# Patient Record
Sex: FEMALE | Race: WHITE | Employment: FULL TIME | URBAN - METROPOLITAN AREA
[De-identification: names, ages, dates, MRNs, and addresses within clinical notes are randomized per-mention and may not be internally consistent; named-entity substitution may affect disease eponyms.]

---

## 2020-01-06 LAB — GROUP B STREP PCR: NORMAL

## 2020-01-29 ENCOUNTER — HOSPITAL ENCOUNTER (OUTPATIENT)
Dept: LAB | Facility: CLINIC | Age: 35
Discharge: HOME | End: 2020-01-29
Admitting: OBSTETRICS & GYNECOLOGY
Payer: COMMERCIAL

## 2020-01-29 DIAGNOSIS — Z01.818 PRE-OP EXAM: ICD-10-CM

## 2020-01-29 LAB
ABO + RH BLD: ABNORMAL
ABO + RH BLD: ABNORMAL
BLD GP AB INVEST PLASRBC-IMP: ABNORMAL
BLD GP AB SCN SERPL QL: ABNORMAL
BLOOD BANK CMNT PATIENT-IMP: ABNORMAL
HGB BLD-MCNC: 12.5 G/DL (ref 11.7–15.7)
SPECIMEN EXP DATE BLD: ABNORMAL
T PALLIDUM AB SER QL: NONREACTIVE

## 2020-01-29 PROCEDURE — 36415 COLL VENOUS BLD VENIPUNCTURE: CPT | Performed by: OBSTETRICS & GYNECOLOGY

## 2020-01-29 PROCEDURE — 86780 TREPONEMA PALLIDUM: CPT | Performed by: OBSTETRICS & GYNECOLOGY

## 2020-01-29 PROCEDURE — 86870 RBC ANTIBODY IDENTIFICATION: CPT | Performed by: OBSTETRICS & GYNECOLOGY

## 2020-01-29 PROCEDURE — 86901 BLOOD TYPING SEROLOGIC RH(D): CPT | Performed by: OBSTETRICS & GYNECOLOGY

## 2020-01-29 PROCEDURE — 86900 BLOOD TYPING SEROLOGIC ABO: CPT | Performed by: OBSTETRICS & GYNECOLOGY

## 2020-01-29 PROCEDURE — 85018 HEMOGLOBIN: CPT | Performed by: OBSTETRICS & GYNECOLOGY

## 2020-01-29 PROCEDURE — 86850 RBC ANTIBODY SCREEN: CPT | Performed by: OBSTETRICS & GYNECOLOGY

## 2020-01-30 ENCOUNTER — ANESTHESIA EVENT (OUTPATIENT)
Dept: OBGYN | Facility: CLINIC | Age: 35
End: 2020-01-30
Payer: COMMERCIAL

## 2020-01-30 ENCOUNTER — ANESTHESIA (OUTPATIENT)
Dept: OBGYN | Facility: CLINIC | Age: 35
End: 2020-01-30
Payer: COMMERCIAL

## 2020-01-30 ENCOUNTER — HOSPITAL ENCOUNTER (INPATIENT)
Facility: CLINIC | Age: 35
LOS: 3 days | Discharge: HOME HEALTH | End: 2020-02-02
Attending: OBSTETRICS & GYNECOLOGY | Admitting: OBSTETRICS & GYNECOLOGY
Payer: COMMERCIAL

## 2020-01-30 DIAGNOSIS — Z98.891 S/P CESAREAN SECTION: Primary | ICD-10-CM

## 2020-01-30 PROCEDURE — 37000009 ZZH ANESTHESIA TECHNICAL FEE, EACH ADDTL 15 MIN: Performed by: OBSTETRICS & GYNECOLOGY

## 2020-01-30 PROCEDURE — 25800030 ZZH RX IP 258 OP 636: Performed by: NURSE ANESTHETIST, CERTIFIED REGISTERED

## 2020-01-30 PROCEDURE — 37000008 ZZH ANESTHESIA TECHNICAL FEE, 1ST 30 MIN: Performed by: OBSTETRICS & GYNECOLOGY

## 2020-01-30 PROCEDURE — 25000125 ZZHC RX 250: Performed by: OBSTETRICS & GYNECOLOGY

## 2020-01-30 PROCEDURE — 25000132 ZZH RX MED GY IP 250 OP 250 PS 637: Performed by: OBSTETRICS & GYNECOLOGY

## 2020-01-30 PROCEDURE — 25800030 ZZH RX IP 258 OP 636: Performed by: OBSTETRICS & GYNECOLOGY

## 2020-01-30 PROCEDURE — 36000056 ZZH SURGERY LEVEL 3 1ST 30 MIN: Performed by: OBSTETRICS & GYNECOLOGY

## 2020-01-30 PROCEDURE — 71000012 ZZH RECOVERY PHASE 1 LEVEL 1 FIRST HR: Performed by: OBSTETRICS & GYNECOLOGY

## 2020-01-30 PROCEDURE — 25000128 H RX IP 250 OP 636: Performed by: OBSTETRICS & GYNECOLOGY

## 2020-01-30 PROCEDURE — 25000128 H RX IP 250 OP 636: Performed by: NURSE ANESTHETIST, CERTIFIED REGISTERED

## 2020-01-30 PROCEDURE — 25000125 ZZHC RX 250: Performed by: NURSE ANESTHETIST, CERTIFIED REGISTERED

## 2020-01-30 PROCEDURE — 12000035 ZZH R&B POSTPARTUM

## 2020-01-30 PROCEDURE — 27210794 ZZH OR GENERAL SUPPLY STERILE: Performed by: OBSTETRICS & GYNECOLOGY

## 2020-01-30 PROCEDURE — 36000058 ZZH SURGERY LEVEL 3 EA 15 ADDTL MIN: Performed by: OBSTETRICS & GYNECOLOGY

## 2020-01-30 RX ORDER — ACETAMINOPHEN 325 MG/1
TABLET ORAL
Status: DISCONTINUED
Start: 2020-01-30 | End: 2020-01-30 | Stop reason: HOSPADM

## 2020-01-30 RX ORDER — ONDANSETRON 4 MG/1
4 TABLET, ORALLY DISINTEGRATING ORAL EVERY 30 MIN PRN
Status: DISCONTINUED | OUTPATIENT
Start: 2020-01-30 | End: 2020-01-30 | Stop reason: HOSPADM

## 2020-01-30 RX ORDER — LANOLIN 100 %
OINTMENT (GRAM) TOPICAL
Status: DISCONTINUED | OUTPATIENT
Start: 2020-01-30 | End: 2020-02-02 | Stop reason: HOSPADM

## 2020-01-30 RX ORDER — ONDANSETRON 2 MG/ML
INJECTION INTRAMUSCULAR; INTRAVENOUS PRN
Status: DISCONTINUED | OUTPATIENT
Start: 2020-01-30 | End: 2020-01-30

## 2020-01-30 RX ORDER — ONDANSETRON 2 MG/ML
INJECTION INTRAMUSCULAR; INTRAVENOUS
Status: DISCONTINUED
Start: 2020-01-30 | End: 2020-01-30 | Stop reason: HOSPADM

## 2020-01-30 RX ORDER — ACETAMINOPHEN 325 MG/1
975 TABLET ORAL EVERY 8 HOURS
Status: COMPLETED | OUTPATIENT
Start: 2020-01-30 | End: 2020-02-02

## 2020-01-30 RX ORDER — OXYTOCIN 10 [USP'U]/ML
10 INJECTION, SOLUTION INTRAMUSCULAR; INTRAVENOUS
Status: DISCONTINUED | OUTPATIENT
Start: 2020-01-30 | End: 2020-02-02 | Stop reason: HOSPADM

## 2020-01-30 RX ORDER — NALOXONE HYDROCHLORIDE 0.4 MG/ML
.1-.4 INJECTION, SOLUTION INTRAMUSCULAR; INTRAVENOUS; SUBCUTANEOUS
Status: DISCONTINUED | OUTPATIENT
Start: 2020-01-30 | End: 2020-02-02 | Stop reason: HOSPADM

## 2020-01-30 RX ORDER — NALOXONE HYDROCHLORIDE 0.4 MG/ML
.1-.4 INJECTION, SOLUTION INTRAMUSCULAR; INTRAVENOUS; SUBCUTANEOUS
Status: DISCONTINUED | OUTPATIENT
Start: 2020-01-30 | End: 2020-01-30

## 2020-01-30 RX ORDER — CEFAZOLIN SODIUM 1 G/3ML
1 INJECTION, POWDER, FOR SOLUTION INTRAMUSCULAR; INTRAVENOUS SEE ADMIN INSTRUCTIONS
Status: DISCONTINUED | OUTPATIENT
Start: 2020-01-30 | End: 2020-01-30

## 2020-01-30 RX ORDER — MORPHINE SULFATE 1 MG/ML
INJECTION, SOLUTION EPIDURAL; INTRATHECAL; INTRAVENOUS
Status: DISCONTINUED
Start: 2020-01-30 | End: 2020-01-30 | Stop reason: HOSPADM

## 2020-01-30 RX ORDER — EPHEDRINE SULFATE 50 MG/ML
INJECTION, SOLUTION INTRAMUSCULAR; INTRAVENOUS; SUBCUTANEOUS PRN
Status: DISCONTINUED | OUTPATIENT
Start: 2020-01-30 | End: 2020-01-30

## 2020-01-30 RX ORDER — FENTANYL CITRATE 50 UG/ML
INJECTION, SOLUTION INTRAMUSCULAR; INTRAVENOUS PRN
Status: DISCONTINUED | OUTPATIENT
Start: 2020-01-30 | End: 2020-01-30

## 2020-01-30 RX ORDER — HYDROMORPHONE HYDROCHLORIDE 1 MG/ML
.3-.5 INJECTION, SOLUTION INTRAMUSCULAR; INTRAVENOUS; SUBCUTANEOUS EVERY 30 MIN PRN
Status: DISCONTINUED | OUTPATIENT
Start: 2020-01-30 | End: 2020-02-02 | Stop reason: HOSPADM

## 2020-01-30 RX ORDER — CITRIC ACID/SODIUM CITRATE 334-500MG
30 SOLUTION, ORAL ORAL
Status: COMPLETED | OUTPATIENT
Start: 2020-01-30 | End: 2020-01-30

## 2020-01-30 RX ORDER — SODIUM CHLORIDE, SODIUM LACTATE, POTASSIUM CHLORIDE, CALCIUM CHLORIDE 600; 310; 30; 20 MG/100ML; MG/100ML; MG/100ML; MG/100ML
INJECTION, SOLUTION INTRAVENOUS CONTINUOUS
Status: DISCONTINUED | OUTPATIENT
Start: 2020-01-30 | End: 2020-01-30

## 2020-01-30 RX ORDER — FENTANYL CITRATE 50 UG/ML
25-50 INJECTION, SOLUTION INTRAMUSCULAR; INTRAVENOUS
Status: DISCONTINUED | OUTPATIENT
Start: 2020-01-30 | End: 2020-01-30 | Stop reason: HOSPADM

## 2020-01-30 RX ORDER — BUPIVACAINE HYDROCHLORIDE 7.5 MG/ML
INJECTION, SOLUTION INTRASPINAL PRN
Status: DISCONTINUED | OUTPATIENT
Start: 2020-01-30 | End: 2020-01-30

## 2020-01-30 RX ORDER — NALOXONE HYDROCHLORIDE 0.4 MG/ML
.1-.4 INJECTION, SOLUTION INTRAMUSCULAR; INTRAVENOUS; SUBCUTANEOUS
Status: ACTIVE | OUTPATIENT
Start: 2020-01-30 | End: 2020-01-31

## 2020-01-30 RX ORDER — DEXTROSE, SODIUM CHLORIDE, SODIUM LACTATE, POTASSIUM CHLORIDE, AND CALCIUM CHLORIDE 5; .6; .31; .03; .02 G/100ML; G/100ML; G/100ML; G/100ML; G/100ML
INJECTION, SOLUTION INTRAVENOUS CONTINUOUS
Status: DISCONTINUED | OUTPATIENT
Start: 2020-01-30 | End: 2020-02-02 | Stop reason: HOSPADM

## 2020-01-30 RX ORDER — SIMETHICONE 80 MG
80 TABLET,CHEWABLE ORAL 4 TIMES DAILY PRN
Status: DISCONTINUED | OUTPATIENT
Start: 2020-01-30 | End: 2020-02-02 | Stop reason: HOSPADM

## 2020-01-30 RX ORDER — OXYTOCIN/0.9 % SODIUM CHLORIDE 30/500 ML
PLASTIC BAG, INJECTION (ML) INTRAVENOUS
Status: DISCONTINUED
Start: 2020-01-30 | End: 2020-01-30 | Stop reason: HOSPADM

## 2020-01-30 RX ORDER — KETOROLAC TROMETHAMINE 30 MG/ML
30 INJECTION, SOLUTION INTRAMUSCULAR; INTRAVENOUS EVERY 6 HOURS
Status: COMPLETED | OUTPATIENT
Start: 2020-01-30 | End: 2020-01-31

## 2020-01-30 RX ORDER — OXYTOCIN/0.9 % SODIUM CHLORIDE 30/500 ML
100 PLASTIC BAG, INJECTION (ML) INTRAVENOUS CONTINUOUS
Status: DISCONTINUED | OUTPATIENT
Start: 2020-01-30 | End: 2020-02-02 | Stop reason: HOSPADM

## 2020-01-30 RX ORDER — HYDROCORTISONE 2.5 %
CREAM (GRAM) TOPICAL 3 TIMES DAILY PRN
Status: DISCONTINUED | OUTPATIENT
Start: 2020-01-30 | End: 2020-02-01

## 2020-01-30 RX ORDER — AMOXICILLIN 250 MG
1 CAPSULE ORAL 2 TIMES DAILY PRN
Status: DISCONTINUED | OUTPATIENT
Start: 2020-01-30 | End: 2020-02-02 | Stop reason: HOSPADM

## 2020-01-30 RX ORDER — AMOXICILLIN 250 MG
2 CAPSULE ORAL 2 TIMES DAILY PRN
Status: DISCONTINUED | OUTPATIENT
Start: 2020-01-30 | End: 2020-02-02 | Stop reason: HOSPADM

## 2020-01-30 RX ORDER — LIDOCAINE 40 MG/G
CREAM TOPICAL
Status: DISCONTINUED | OUTPATIENT
Start: 2020-01-30 | End: 2020-02-02 | Stop reason: HOSPADM

## 2020-01-30 RX ORDER — BISACODYL 10 MG
10 SUPPOSITORY, RECTAL RECTAL DAILY PRN
Status: DISCONTINUED | OUTPATIENT
Start: 2020-02-01 | End: 2020-02-02 | Stop reason: HOSPADM

## 2020-01-30 RX ORDER — OXYTOCIN/0.9 % SODIUM CHLORIDE 30/500 ML
340 PLASTIC BAG, INJECTION (ML) INTRAVENOUS CONTINUOUS PRN
Status: DISCONTINUED | OUTPATIENT
Start: 2020-01-30 | End: 2020-02-02 | Stop reason: HOSPADM

## 2020-01-30 RX ORDER — ACETAMINOPHEN 325 MG/1
650 TABLET ORAL EVERY 4 HOURS PRN
Status: DISCONTINUED | OUTPATIENT
Start: 2020-02-02 | End: 2020-02-02 | Stop reason: HOSPADM

## 2020-01-30 RX ORDER — KETOROLAC TROMETHAMINE 30 MG/ML
INJECTION, SOLUTION INTRAMUSCULAR; INTRAVENOUS
Status: COMPLETED
Start: 2020-01-30 | End: 2020-01-30

## 2020-01-30 RX ORDER — OXYTOCIN/0.9 % SODIUM CHLORIDE 30/500 ML
PLASTIC BAG, INJECTION (ML) INTRAVENOUS CONTINUOUS PRN
Status: DISCONTINUED | OUTPATIENT
Start: 2020-01-30 | End: 2020-01-30

## 2020-01-30 RX ORDER — IBUPROFEN 400 MG/1
800 TABLET, FILM COATED ORAL EVERY 6 HOURS PRN
Status: DISCONTINUED | OUTPATIENT
Start: 2020-01-30 | End: 2020-01-30

## 2020-01-30 RX ORDER — SODIUM CHLORIDE, SODIUM LACTATE, POTASSIUM CHLORIDE, CALCIUM CHLORIDE 600; 310; 30; 20 MG/100ML; MG/100ML; MG/100ML; MG/100ML
INJECTION, SOLUTION INTRAVENOUS CONTINUOUS
Status: DISCONTINUED | OUTPATIENT
Start: 2020-01-30 | End: 2020-01-30 | Stop reason: HOSPADM

## 2020-01-30 RX ORDER — NALBUPHINE HYDROCHLORIDE 10 MG/ML
2.5-5 INJECTION, SOLUTION INTRAMUSCULAR; INTRAVENOUS; SUBCUTANEOUS EVERY 6 HOURS PRN
Status: DISCONTINUED | OUTPATIENT
Start: 2020-01-30 | End: 2020-01-30

## 2020-01-30 RX ORDER — CEFAZOLIN SODIUM 2 G/100ML
2 INJECTION, SOLUTION INTRAVENOUS
Status: COMPLETED | OUTPATIENT
Start: 2020-01-30 | End: 2020-01-30

## 2020-01-30 RX ORDER — IBUPROFEN 400 MG/1
800 TABLET, FILM COATED ORAL EVERY 6 HOURS PRN
Status: DISCONTINUED | OUTPATIENT
Start: 2020-01-31 | End: 2020-02-02 | Stop reason: HOSPADM

## 2020-01-30 RX ORDER — EPHEDRINE SULFATE 50 MG/ML
5 INJECTION, SOLUTION INTRAMUSCULAR; INTRAVENOUS; SUBCUTANEOUS
Status: DISCONTINUED | OUTPATIENT
Start: 2020-01-30 | End: 2020-01-30

## 2020-01-30 RX ORDER — LIDOCAINE 40 MG/G
CREAM TOPICAL
Status: DISCONTINUED | OUTPATIENT
Start: 2020-01-30 | End: 2020-01-30

## 2020-01-30 RX ORDER — ONDANSETRON 2 MG/ML
4 INJECTION INTRAMUSCULAR; INTRAVENOUS EVERY 6 HOURS PRN
Status: DISCONTINUED | OUTPATIENT
Start: 2020-01-30 | End: 2020-02-02 | Stop reason: HOSPADM

## 2020-01-30 RX ORDER — CALCIUM CARBONATE 500 MG/1
2 TABLET, CHEWABLE ORAL 3 TIMES DAILY
Status: ON HOLD | COMMUNITY
End: 2020-02-02

## 2020-01-30 RX ORDER — ONDANSETRON 2 MG/ML
4 INJECTION INTRAMUSCULAR; INTRAVENOUS EVERY 30 MIN PRN
Status: DISCONTINUED | OUTPATIENT
Start: 2020-01-30 | End: 2020-01-30 | Stop reason: HOSPADM

## 2020-01-30 RX ORDER — MORPHINE SULFATE 1 MG/ML
INJECTION, SOLUTION EPIDURAL; INTRATHECAL; INTRAVENOUS PRN
Status: DISCONTINUED | OUTPATIENT
Start: 2020-01-30 | End: 2020-01-30

## 2020-01-30 RX ORDER — OXYCODONE HYDROCHLORIDE 5 MG/1
5-10 TABLET ORAL
Status: DISCONTINUED | OUTPATIENT
Start: 2020-01-30 | End: 2020-02-02 | Stop reason: HOSPADM

## 2020-01-30 RX ORDER — KETOROLAC TROMETHAMINE 30 MG/ML
INJECTION, SOLUTION INTRAMUSCULAR; INTRAVENOUS PRN
Status: DISCONTINUED | OUTPATIENT
Start: 2020-01-30 | End: 2020-01-30

## 2020-01-30 RX ORDER — FENTANYL CITRATE 50 UG/ML
INJECTION, SOLUTION INTRAMUSCULAR; INTRAVENOUS
Status: DISCONTINUED
Start: 2020-01-30 | End: 2020-01-30 | Stop reason: HOSPADM

## 2020-01-30 RX ORDER — HYDROMORPHONE HYDROCHLORIDE 1 MG/ML
.3-.5 INJECTION, SOLUTION INTRAMUSCULAR; INTRAVENOUS; SUBCUTANEOUS EVERY 5 MIN PRN
Status: DISCONTINUED | OUTPATIENT
Start: 2020-01-30 | End: 2020-01-30 | Stop reason: HOSPADM

## 2020-01-30 RX ADMIN — PHENYLEPHRINE HYDROCHLORIDE 0.5 MCG/KG/MIN: 10 INJECTION INTRAVENOUS at 13:02

## 2020-01-30 RX ADMIN — SODIUM CITRATE AND CITRIC ACID MONOHYDRATE 30 ML: 500; 334 SOLUTION ORAL at 12:48

## 2020-01-30 RX ADMIN — PHENYLEPHRINE HYDROCHLORIDE 100 MCG: 10 INJECTION INTRAVENOUS at 13:45

## 2020-01-30 RX ADMIN — PHENYLEPHRINE HYDROCHLORIDE 100 MCG: 10 INJECTION INTRAVENOUS at 13:51

## 2020-01-30 RX ADMIN — SENNOSIDES AND DOCUSATE SODIUM 1 TABLET: 8.6; 5 TABLET ORAL at 20:45

## 2020-01-30 RX ADMIN — SODIUM CHLORIDE, POTASSIUM CHLORIDE, SODIUM LACTATE AND CALCIUM CHLORIDE 1000 ML: 600; 310; 30; 20 INJECTION, SOLUTION INTRAVENOUS at 10:30

## 2020-01-30 RX ADMIN — Medication 100 ML/HR: at 17:27

## 2020-01-30 RX ADMIN — Medication 5 MG: at 13:12

## 2020-01-30 RX ADMIN — KETOROLAC TROMETHAMINE 30 MG: 30 INJECTION, SOLUTION INTRAMUSCULAR at 14:05

## 2020-01-30 RX ADMIN — MORPHINE SULFATE 0.15 MG: 1 INJECTION, SOLUTION EPIDURAL; INTRATHECAL; INTRAVENOUS at 13:02

## 2020-01-30 RX ADMIN — Medication 340 ML/HR: at 13:27

## 2020-01-30 RX ADMIN — BUPIVACAINE HYDROCHLORIDE IN DEXTROSE 12 MG: 7.5 INJECTION, SOLUTION SUBARACHNOID at 13:02

## 2020-01-30 RX ADMIN — KETOROLAC TROMETHAMINE 30 MG: 30 INJECTION, SOLUTION INTRAMUSCULAR at 20:45

## 2020-01-30 RX ADMIN — ACETAMINOPHEN 975 MG: 325 TABLET, FILM COATED ORAL at 23:35

## 2020-01-30 RX ADMIN — ONDANSETRON 4 MG: 2 INJECTION INTRAMUSCULAR; INTRAVENOUS at 12:56

## 2020-01-30 RX ADMIN — CEFAZOLIN SODIUM 2 G: 2 INJECTION, SOLUTION INTRAVENOUS at 13:04

## 2020-01-30 RX ADMIN — SODIUM CHLORIDE, POTASSIUM CHLORIDE, SODIUM LACTATE AND CALCIUM CHLORIDE: 600; 310; 30; 20 INJECTION, SOLUTION INTRAVENOUS at 10:20

## 2020-01-30 RX ADMIN — Medication 5 MG: at 13:40

## 2020-01-30 RX ADMIN — FENTANYL CITRATE 15 MCG: 50 INJECTION, SOLUTION INTRAMUSCULAR; INTRAVENOUS at 13:02

## 2020-01-30 RX ADMIN — SODIUM CHLORIDE, SODIUM LACTATE, POTASSIUM CHLORIDE, CALCIUM CHLORIDE AND DEXTROSE MONOHYDRATE 1000 ML: 5; 600; 310; 30; 20 INJECTION, SOLUTION INTRAVENOUS at 22:14

## 2020-01-30 RX ADMIN — ACETAMINOPHEN 975 MG: 325 TABLET, FILM COATED ORAL at 14:32

## 2020-01-30 ASSESSMENT — MIFFLIN-ST. JEOR: SCORE: 1503.07

## 2020-01-30 ASSESSMENT — COPD QUESTIONNAIRES: COPD: 0

## 2020-01-30 ASSESSMENT — LIFESTYLE VARIABLES: TOBACCO_USE: 0

## 2020-01-30 NOTE — OP NOTE
Section Operative Note     PREOPERATIVE DIAGNOSES:   1. Intrauterine pregnancy at 39w4d    2. Previous  section x2  POSTOPERATIVE DIAGNOSES:   1. Same  2. S/p repeat  section  PROCEDURE: Repeat low transverse  section  ESTIMATED BLOOD LOSS: 270 ml  UOP:  400ml  SURGEON: Padmini Carreno MD  ASSISTANT: Brina Resendez NP  ANESTHESIA: Spinal with Duramorph  ANTIBIOTICS: 2 grams IV cefazolin  FINDINGS: A viable vigorous female infant delivered at 1:35pm hours on 2020 delivered from the vertex presentation. Clear amniotic fluid. Weight was 6 pounds 12 ounces. Apgars were 8 and 9 at 1 and 5 minutes respectively. Normal appearing uterus, tubes and ovaries. Placenta appeared grossly normal.  COMPLICATIONS: None apparent.   INDICATIONS: Patient is a 34 year old ,   At 39w4d  weeks gestational age who presented to Labor and Delivery for a scheduled repeat  section.  Risks, benefits and alternatives to proceeding with a  section were discussed with the patient including risk of infection, bleeding, injury to surrounding structures and the patient wished to proceed. Consent form was reviewed and signed prior to proceeding.  PROCEDURE: The patient was taken to the operating room with IV fluids running. She was then given a spinal anesthetic without complications. She received Ancef 2 grams IV prior to starting the procedure. Pneumoboots were on and activated. The patient was placed in the dorsal supine position with a leftward tilt and a Soto catheter was placed. She was then prepped and draped in the usual sterile fashion. A Pfannenstiel skin incision was then made and carried through to the underlying layer of fascia using the scalpel and cautery. The fascia was then incised sharply and extended laterally with Barry scissors. The fascia was then grasped with Kocher clamps and elevated off the underlying rectus muscles with the use of the cautery and Barry scissors.  Scar tissue was noted involving the fascia and peritoneum. The rectus muscles were  along the midline. The peritoneum was identified and entered bluntly. The peritoneal incision was extended laterally with good visualization of the bladder. A large Martín retractor was placed. The vesicouterine peritoneum was identified and incised sharply with the Metzenbaum scissors. The bladder flap was then created digitally.   The lower uterine segment was identified and was quite thin. It was incised sharply with a scalpel in a transverse fashion. The uterine incision was then extended with cephalocaudad stretch. The infant was then delivered from the vertex OT presentation. The cord was clamped and cut after 1 minute. A portion of cord was cut for cord gases. Then bulb suction was used to suction the mouth and nares. The infant was then handed to the waiting nursing team.   At this point the placenta was then manually extracted. Pitocin was given in IV fluids after delivery of the placenta. The uterus was left in situ and cleared of all clots and debris. The uterine incision was then repaired with 0 Vicryl suture in a running locked fashion. A second imbricating layer was then used to horizontally imbricate for hemostasis. The gutters were irrigated and cleared of all clots and debris. The uterine incision was then inspected and noted to be hemostatic.   The rectus muscles, fascia and subcutaneous layers were then inspected and made hemostatic with cautery. The peritoneum was closed with a running suture of 3-0 Vicryl. Fascia was closed with 0 Vicryl. Subcutaneous tissues were copiously irrigated and made hemostatic with cautery.  The skin was then closed with a running suture of 4-0 Vicryl followed by steri strips and a sterile dressing.   Sponge, lap and needle counts were reported as correct. The patient tolerated the procedure well and was taken to the Recovery Room in stable condition.     Padmini Carreno  MD  1/30/2020  2:08 PM

## 2020-01-30 NOTE — ANESTHESIA PROCEDURE NOTES
Peripheral nerve/Neuraxial procedure note : intrathecal  Pre-Procedure  Performed by Coleman Nunes MD  Location: OR      Pre-Anesthestic Checklist: patient identified, IV checked, risks and benefits discussed, informed consent, monitors and equipment checked, pre-op evaluation and at physician/surgeon's request    Timeout  Correct Patient: Yes   Correct Procedure: Yes   Correct Site: Yes   Correct Laterality: Yes   Correct Position: Yes     .   Procedure Documentation    .    Procedure: intrathecal, .   Patient Position:sitting Insertion Site:L4-5  (midline approach)     Patient Prep/Sterile Barriers; mask, sterile gloves, povidone-iodine 7.5% surgical scrub, patient draped.  .  Needle:  Spinal Needle (gauge): 25  Spinal/LP Needle Length (inches): 3.5 Introducer used Introducer: 20 G .        Assessment/Narrative  Paresthesias: No.  .  .  clear CSF fluid removed . Comments:  Injected 12 mg bupivacaine and 0.15 mg duramorph and 15 mcg of fentanyl

## 2020-01-30 NOTE — ANESTHESIA CARE TRANSFER NOTE
Patient: Merline Ortiz    Procedure(s):  REPEAT LOW TRANSVERSE  SECTION    Diagnosis: Previous  section [Z98.891]  Diagnosis Additional Information: No value filed.    Anesthesia Type:   Spinal     Note:  Airway :Room Air  Patient transferred to:Labor and Delivery  Comments: Pt to OB PACU on room air, airway patent, VSS.  Report to RN.Handoff Report: Identifed the Patient, Identified the Reponsible Provider, Reviewed the pertinent medical history, Discussed the surgical course, Reviewed Intra-OP anesthesia mangement and issues during anesthesia, Set expectations for post-procedure period and Allowed opportunity for questions and acknowledgement of understanding      Vitals: (Last set prior to Anesthesia Care Transfer)    CRNA VITALS  2020 1339 - 2020 1414      2020             Resp Rate (set):  10                Electronically Signed By: MACARENA Rascon CRNA  2020  2:14 PM

## 2020-01-30 NOTE — ANESTHESIA PREPROCEDURE EVALUATION
Anesthesia Pre-Procedure Evaluation    Patient: Merline Ortiz   MRN: [id number] : 1985          Preoperative Diagnosis: Previous  section [Z98.891]    Procedure(s):  REPEAT LOW TRANSVERSE  SECTION    Past Medical History:   Diagnosis Date     Chronic kidney disease     hx. 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 Pt has had prior anesthetic. Type: General    History of anesthetic complications    high spinal with first pregancy      ROS/MED HX    ENT/Pulmonary:      (-) tobacco use, asthma and COPD   Neurologic:      (-) CVA, TIA and Neuropathy   Cardiovascular:        (-) hypertension, CAD, irregular heartbeat/palpitations and stent   METS/Exercise Tolerance:     Hematologic:        (-) anemia   Musculoskeletal:         GI/Hepatic:     (+) GERD      (-) liver disease   Renal/Genitourinary:      (-) renal disease   Endo:      (-) Type I DM, Type II DM and thyroid disease   Psychiatric:         Infectious Disease:  - neg infectious disease ROS       Malignancy:         Other:                          Physical Exam  Normal systems: cardiovascular, pulmonary and dental    Airway   Mallampati: II  TM distance: >3 FB  Neck ROM: full    Dental     Cardiovascular   Rhythm and rate: regular and normal      Pulmonary    breath sounds clear to auscultation            Lab Results   Component Value Date    WBC 8.8 2015    HGB 12.5 2020    HCT 34.5 (L) 2015     2015     2015     "POTASSIUM 3.7 08/28/2015    CHLORIDE 108 08/28/2015    CO2 21 08/28/2015    BUN 6 (L) 08/28/2015    CR 0.51 (L) 08/28/2015    GLC 81 08/28/2015    KARI 8.4 (L) 08/28/2015       Preop Vitals  BP Readings from Last 3 Encounters:   08/31/15 90/56    Pulse Readings from Last 3 Encounters:   08/31/15 62      Resp Readings from Last 3 Encounters:   08/31/15 16    SpO2 Readings from Last 3 Encounters:   08/29/15 100%      Temp Readings from Last 1 Encounters:   08/31/15 36.9  C (98.4  F) (Oral)    Ht Readings from Last 1 Encounters:   01/30/20 1.664 m (5' 5.5\")      Wt Readings from Last 1 Encounters:   01/30/20 78.9 kg (174 lb)    Estimated body mass index is 28.51 kg/m  as calculated from the following:    Height as of this encounter: 1.664 m (5' 5.5\").    Weight as of this encounter: 78.9 kg (174 lb).       Anesthesia Plan      History & Physical Review  History and physical reviewed and following examination; no interval change.    ASA Status:  2 .    NPO Status:  > 6 hours    Plan for Spinal   PONV prophylaxis:  Ondansetron (or other 5HT-3)       Postoperative Care      Consents  Anesthetic plan, risks, benefits and alternatives discussed with:  Patient..                 Coleman Nunes MD  "

## 2020-01-30 NOTE — PLAN OF CARE
Pt arrived at approx 0900 for repeat  section with spouse. Orientation to unit routine and plan of care completed. Assumed care of patient in OR as circulating RN. See OR documentation for details. Pt transferred to recovery room for recovery. Fundus firm, U/2, scant flow. Incision c/d/i. Pt denies pain. Pt given toradol by CRNA near end of procedure and tylenol in recovery room. Breast feeding infant, bonding with infant. All questions and concerns addressed. Cont to monitor and assess.

## 2020-01-30 NOTE — PLAN OF CARE
Data: Merline Ortiz transferred to room 403 via wheelchair at 1620. Baby transferred via parent's arms.  Action: Receiving unit notified of transfer: Yes. Patient and family notified of room change. Report given to Kiera BREWER at 1620. Belongings sent to receiving unit. Accompanied by Registered Nurse. Oriented patient to surroundings. Call light within reach. ID bands double-checked with receiving RN.  Response: Patient tolerated transfer and is stable.

## 2020-01-30 NOTE — ANESTHESIA POSTPROCEDURE EVALUATION
Patient: Merline Ortiz    Procedure(s):  REPEAT LOW TRANSVERSE  SECTION    Diagnosis:Previous  section [Z98.891]  Diagnosis Additional Information: No value filed.    Anesthesia Type:  Spinal    Note:  Anesthesia Post Evaluation    Patient location during evaluation: OB PACU  Patient participation: Able to participate in evaluation but full recovery from regional anesthesia has not yet ocurrred but is anticipated to occur within 48 hours (Spinal wearing off)  Level of consciousness: awake and alert  Pain management: adequate  Airway patency: patent  Cardiovascular status: acceptable  Respiratory status: acceptable  Hydration status: acceptable  PONV: none     Anesthetic complications: None          Last vitals:  Vitals:    20 1530 20 1545 20 1600   BP: 104/78 (!) 88/57 95/65   Pulse: 61 62 68   Resp: 15 12 16   Temp:   36.5  C (97.7  F)   SpO2: 97% 94% 99%         Electronically Signed By: Coleman Nunes MD  2020  4:27 PM

## 2020-01-31 LAB
BLOOD BANK CMNT PATIENT-IMP: NORMAL
HGB BLD-MCNC: 10.7 G/DL (ref 11.7–15.7)

## 2020-01-31 PROCEDURE — 12000035 ZZH R&B POSTPARTUM

## 2020-01-31 PROCEDURE — 25000128 H RX IP 250 OP 636: Performed by: OBSTETRICS & GYNECOLOGY

## 2020-01-31 PROCEDURE — 36415 COLL VENOUS BLD VENIPUNCTURE: CPT | Performed by: OBSTETRICS & GYNECOLOGY

## 2020-01-31 PROCEDURE — 85018 HEMOGLOBIN: CPT | Performed by: OBSTETRICS & GYNECOLOGY

## 2020-01-31 PROCEDURE — 85461 HEMOGLOBIN FETAL: CPT | Performed by: OBSTETRICS & GYNECOLOGY

## 2020-01-31 PROCEDURE — 86900 BLOOD TYPING SEROLOGIC ABO: CPT | Performed by: OBSTETRICS & GYNECOLOGY

## 2020-01-31 PROCEDURE — 86901 BLOOD TYPING SEROLOGIC RH(D): CPT | Performed by: OBSTETRICS & GYNECOLOGY

## 2020-01-31 PROCEDURE — 25000132 ZZH RX MED GY IP 250 OP 250 PS 637: Performed by: OBSTETRICS & GYNECOLOGY

## 2020-01-31 RX ADMIN — SENNOSIDES AND DOCUSATE SODIUM 1 TABLET: 8.6; 5 TABLET ORAL at 20:06

## 2020-01-31 RX ADMIN — IBUPROFEN 800 MG: 400 TABLET ORAL at 20:59

## 2020-01-31 RX ADMIN — OXYCODONE HYDROCHLORIDE 5 MG: 5 TABLET ORAL at 15:27

## 2020-01-31 RX ADMIN — OXYCODONE HYDROCHLORIDE 5 MG: 5 TABLET ORAL at 20:06

## 2020-01-31 RX ADMIN — ACETAMINOPHEN 975 MG: 325 TABLET, FILM COATED ORAL at 15:27

## 2020-01-31 RX ADMIN — KETOROLAC TROMETHAMINE 30 MG: 30 INJECTION, SOLUTION INTRAMUSCULAR at 08:40

## 2020-01-31 RX ADMIN — ACETAMINOPHEN 975 MG: 325 TABLET, FILM COATED ORAL at 07:54

## 2020-01-31 RX ADMIN — ACETAMINOPHEN 975 MG: 325 TABLET, FILM COATED ORAL at 23:41

## 2020-01-31 RX ADMIN — KETOROLAC TROMETHAMINE 30 MG: 30 INJECTION, SOLUTION INTRAMUSCULAR at 14:49

## 2020-01-31 RX ADMIN — SIMETHICONE CHEW TAB 80 MG 80 MG: 80 TABLET ORAL at 20:06

## 2020-01-31 RX ADMIN — SIMETHICONE CHEW TAB 80 MG 80 MG: 80 TABLET ORAL at 12:02

## 2020-01-31 RX ADMIN — KETOROLAC TROMETHAMINE 30 MG: 30 INJECTION, SOLUTION INTRAMUSCULAR at 02:43

## 2020-01-31 RX ADMIN — SIMETHICONE CHEW TAB 80 MG 80 MG: 80 TABLET ORAL at 23:41

## 2020-01-31 RX ADMIN — SIMETHICONE CHEW TAB 80 MG 80 MG: 80 TABLET ORAL at 08:00

## 2020-01-31 RX ADMIN — OXYCODONE HYDROCHLORIDE 5 MG: 5 TABLET ORAL at 23:41

## 2020-01-31 RX ADMIN — SENNOSIDES AND DOCUSATE SODIUM 1 TABLET: 8.6; 5 TABLET ORAL at 07:54

## 2020-01-31 NOTE — PLAN OF CARE
Vital signs stable; BP runs on the low side. Postpartum assessment WDL. Incision dressing with old drainage. Pain controlled with Tylenol and Toradol. Patient ambulating with SBA. Pt up to the bathroom this morning at 0645 and catheter was removed. Amie care done. Breastfeeding on demand, independently. Patient and infant bonding well. Will continue with current plan of care.

## 2020-01-31 NOTE — LACTATION NOTE
Initial visit with ALAN Rick and baby.  Baby latched on well to the left breast. Breast fed her other two children and breast fed her now 5 year old for 16 months.    Breastfeeding general information reviewed.   Advised to breastfeed exclusively, on demand, avoid pacifiers, bottles and formula unless medically indicated.  Encouraged rooming in, skin to skin, feeding on demand 8-12x/day or sooner if baby cues. Baby has been cluster feeding.  Reviewed second night.    Explained benefits of holding and skin to skin.  Encouraged lots of skin to skin. Instructed on hand expression. Questions answered regarding pumping and physiology of milk supply and production, plans to take a breast pump home.   Instructed on signs/symptoms of engorgement/ plugged ducts and mastitis.  Instructed on comfort measures and when to call MD.  Continues to nurse well per mom. No further questions at this time.   Will follow as needed.   Hillary Morgan BSN, RN, PHN, RNC-MNN, IBCLC

## 2020-01-31 NOTE — PLAN OF CARE
Pt. admitted from L&D via bed.. Pt. arrived with baby and was accompanied by spouse, Mani,  and arrived with personal belongings. Report was taken from Samreen INMAN RN  in L&D.  Pt. is A&Ox3 and VS are WNL and oxygen was 98%  on RA. Fundus is firm and midline.  Vaginal bleeding is light.   Pt.denies pain. Pt. denied  nausea, CP, SOB, lightheadedness, or dizziness. LS are clear and BS present and hypo. Patient hungry and switched to Regular diet. She was advised to order a light meal.  PIV patent and infusing.  Soto patent and draining.  Dressing to lower abdomen has slight drainage. Pneumoboots in place to BLE.  Pt. oriented to the room and call light system.  Parents updated on safety protocol.

## 2020-01-31 NOTE — PLAN OF CARE
Patient complains of right shoulder strap pain.  Not passing flatus as of this morning.  Simethicone given for comfort as well as hot pack.  Encouraged ambulation.  Tolerating a regular diet, no nausea.  Sore nipple shells given to use with lanolin cream for comfort.  Assisted with latch check.  Will continue to monitor.

## 2020-01-31 NOTE — PROGRESS NOTES
"Post-partum Note      S: Patient is doing well today.  Pain is controlled with PO medications, having some shoulder pain on the right that has slightly decreased with simethicone/time.  Tolerating regular diet without nausea or vomiting.  Soto out this am, has not voided yet. Lochia normal.  Breastfeeding, going okay, having some nipple pain. No light headedness with low bp overnight.     O:   Patient Vitals for the past 24 hrs:   BP Temp Temp src Pulse Heart Rate Resp SpO2 Height Weight   01/31/20 0830 106/64 97.5  F (36.4  C) Axillary 74 71 16 96 % -- --   01/31/20 0605 -- -- -- -- -- 16 98 % -- --   01/31/20 0500 92/50 98.4  F (36.9  C) Oral 63 -- 16 97 % -- --   01/31/20 0430 -- -- -- -- -- 16 98 % -- --   01/31/20 0330 -- -- -- -- -- 16 99 % -- --   01/31/20 0230 -- -- -- -- -- 16 98 % -- --   01/31/20 0130 -- -- -- -- -- 16 98 % -- --   01/31/20 0030 (!) 89/49 97.9  F (36.6  C) Oral 65 -- 16 96 % -- --   01/30/20 2330 -- -- -- -- -- 16 98 % -- --   01/30/20 2120 95/55 -- -- 69 -- 16 96 % -- --   01/30/20 2020 110/67 -- -- 69 -- -- 97 % -- --   01/30/20 1920 91/50 -- -- -- -- 16 98 % -- --   01/30/20 1820 108/71 -- -- 69 -- 16 96 % -- --   01/30/20 1720 103/64 -- -- 78 -- 16 97 % -- --   01/30/20 1653 110/72 -- -- 62 -- 16 98 % -- --   01/30/20 1640 -- -- Oral -- -- 16 99 % -- --   01/30/20 1629 106/68 98.1  F (36.7  C) Oral 63 -- 16 95 % -- --   01/30/20 1600 95/65 97.7  F (36.5  C) Oral 68 71 16 99 % -- --   01/30/20 1545 (!) 88/57 -- -- 62 61 12 94 % -- --   01/30/20 1530 104/78 -- -- 61 67 15 97 % -- --   01/30/20 1515 91/70 -- -- -- 63 13 96 % -- --   01/30/20 1500 90/69 -- -- -- 64 14 95 % -- --   01/30/20 1445 111/63 -- -- -- 69 12 97 % -- --   01/30/20 1430 96/52 -- -- -- 65 18 94 % -- --   01/30/20 1415 102/58 97.5  F (36.4  C) Oral -- 66 18 95 % -- --   01/30/20 0952 -- -- -- -- -- -- -- 1.664 m (5' 5.5\") 78.9 kg (174 lb)   01/30/20 0921 117/69 97  F (36.1  C) -- -- -- -- -- -- --     Gen:  Resting " comfortably, NAD  Pulm:  Breathing comfortably on room air  Abd:  Soft, appropriately ttp, non-distended.Fundus at 1 below umbilicus, firm and non-tender.  Incision: intact with tegaderm dressing with signs of old serosanguinous drainage, no active drainage seen, no evidence of hematoma/infection    I/O last 3 completed shifts:  In: 2977 [I.V.:2977]  Out: 2520 [Urine:2250; Blood:270]    Hgb:     Hemoglobin   Date Value Ref Range Status   2020 12.5 11.7 - 15.7 g/dL Final   2015 9.9 (L) 11.7 - 15.7 g/dL Final       Assessment/Plan:  34 year old  on POD1 after rpt LTCS at 39w4d.    1. Continue with routine postpartum management  2. Incision is c/d/I with sutures/dressing  3. EBL: 270ml ; pre hemoglobin 12.5, post hemogobin pending collection,  no symptoms of anemia.   4. Baby blood type B+, rhogam needed prior to discharge  Lab Results   Component Value Date    ABO A 2020    RH Neg 2020    , Rubella immune  5. Feed: Breastfeeding  6. CV/RESP: no acute issues, bp low overnight, asymtpomatic with this.   7. DVT PPX: ambulation, SCD     Dispo: Anticipate DC home on POD3-4.     MD Regan [facility]  2020, 8:34 AM

## 2020-01-31 NOTE — PLAN OF CARE
Vital signs are WNL and oxygen is 97% and up on room air.  She has been denying pain but taking Tylenol and Toradol as scheduled.  Fundus is firm and U/3.  Mom was up to bathroom to clean up with only a stand by assist.  All questions answered.

## 2020-02-01 LAB
ABO + RH BLD: NORMAL
ABO + RH BLD: NORMAL
BLOOD BANK CMNT PATIENT-IMP: NORMAL
DATE RH IMM GL GVN: NORMAL
FETAL CELL SCN BLD QL ROSETTE: NORMAL
RH IG VIALS RECOM PATIENT: NORMAL

## 2020-02-01 PROCEDURE — 25000132 ZZH RX MED GY IP 250 OP 250 PS 637: Performed by: OBSTETRICS & GYNECOLOGY

## 2020-02-01 PROCEDURE — 12000035 ZZH R&B POSTPARTUM

## 2020-02-01 PROCEDURE — 25000128 H RX IP 250 OP 636: Performed by: OBSTETRICS & GYNECOLOGY

## 2020-02-01 PROCEDURE — 3E0234Z INTRODUCTION OF SERUM, TOXOID AND VACCINE INTO MUSCLE, PERCUTANEOUS APPROACH: ICD-10-PCS | Performed by: OBSTETRICS & GYNECOLOGY

## 2020-02-01 RX ORDER — HYDROCORTISONE 2.5 %
CREAM (GRAM) TOPICAL 3 TIMES DAILY PRN
Status: DISCONTINUED | OUTPATIENT
Start: 2020-02-01 | End: 2020-02-02 | Stop reason: HOSPADM

## 2020-02-01 RX ADMIN — SIMETHICONE CHEW TAB 80 MG 80 MG: 80 TABLET ORAL at 11:55

## 2020-02-01 RX ADMIN — ACETAMINOPHEN 975 MG: 325 TABLET, FILM COATED ORAL at 22:32

## 2020-02-01 RX ADMIN — HYDROCORTISONE: 25 CREAM TOPICAL at 16:20

## 2020-02-01 RX ADMIN — ACETAMINOPHEN 975 MG: 325 TABLET, FILM COATED ORAL at 06:46

## 2020-02-01 RX ADMIN — OXYCODONE HYDROCHLORIDE 5 MG: 5 TABLET ORAL at 06:46

## 2020-02-01 RX ADMIN — ACETAMINOPHEN 975 MG: 325 TABLET, FILM COATED ORAL at 14:53

## 2020-02-01 RX ADMIN — IBUPROFEN 800 MG: 400 TABLET ORAL at 15:54

## 2020-02-01 RX ADMIN — OXYCODONE HYDROCHLORIDE 5 MG: 5 TABLET ORAL at 03:33

## 2020-02-01 RX ADMIN — SENNOSIDES AND DOCUSATE SODIUM 2 TABLET: 8.6; 5 TABLET ORAL at 22:33

## 2020-02-01 RX ADMIN — OXYCODONE HYDROCHLORIDE 5 MG: 5 TABLET ORAL at 09:54

## 2020-02-01 RX ADMIN — SENNOSIDES AND DOCUSATE SODIUM 2 TABLET: 8.6; 5 TABLET ORAL at 08:25

## 2020-02-01 RX ADMIN — IBUPROFEN 800 MG: 400 TABLET ORAL at 03:33

## 2020-02-01 RX ADMIN — IBUPROFEN 800 MG: 400 TABLET ORAL at 09:54

## 2020-02-01 RX ADMIN — IBUPROFEN 800 MG: 400 TABLET ORAL at 22:27

## 2020-02-01 RX ADMIN — HUMAN RHO(D) IMMUNE GLOBULIN 300 MCG: 300 INJECTION, SOLUTION INTRAMUSCULAR at 08:26

## 2020-02-01 RX ADMIN — SIMETHICONE CHEW TAB 80 MG 80 MG: 80 TABLET ORAL at 06:46

## 2020-02-01 NOTE — PLAN OF CARE
VSS, breastfeeding, fundus is firm at U/1, scant flow, no clots.  Pain controlled with Tylenol, Ibuprofen and Oxy 5mg.  Independent with cares.  Simethicone given PRN for gas discomfort and shoulder strap pain.   is at bedside and supportive.  Will continue to monitor and support.

## 2020-02-01 NOTE — PROGRESS NOTES
"OB Post-op  Section Progress Note POD# 2    S:  Patient doing well.  Pain well controlled with oral pain medication.  Ambulating.  Tolerating reg diet.  No N/V.  Passing flatus.  Voiding.  Bleeding is normal.  Breastfeeding.    O:  /65   Pulse 78   Temp 98.4  F (36.9  C) (Oral)   Resp 16   Ht 1.664 m (5' 5.5\")   Wt 78.9 kg (174 lb)   SpO2 96%   Breastfeeding Unknown   BMI 28.51 kg/m      Gen- A&O, NAD  Abd- soft, non tender, non distended  Fundus- firm, non tender  Incision/Dressing- C/D/I, steristrips in place  Ext- non-tender, no edema.     Hemoglobin   Date Value Ref Range Status   2020 10.7 (L) 11.7 - 15.7 g/dL Final     A neg, baby Rh +  Rubella =immune    A/P:  34 year old  POD# 2 s/p repeat LTCS     1.  Routine post-op cares  2.  Analgesia adequate  3.  Ambulate   4.  Discharge POD#3    Padmini Carreno MD  2020  7:41 AM   "

## 2020-02-01 NOTE — PLAN OF CARE
Fundus firm and bleeding wnl.  VSS.  Incision clean, dry, intact and covered with steri strips.  Rates pain 4/10 and taking scheduled tylenol, ibuprofen and oxycodone as needed with good relief.  Using nipple shield for tender nipples.  Up independently.  Using abdominal binder.  Passing flatus and tolerating regular diet.  Encouraged to call with questions or concerns.  Will continue to monitor.

## 2020-02-01 NOTE — PLAN OF CARE
Patient doing well, having adequate pain control with tylenol, ibuprofen and oxycodone PO.  Patient states her right shoulder strap pain is gone.  Passing flatus.  Encouraged continued ambulation.  Fundus firm, lochia within normal limits.  Will continue to monitor.

## 2020-02-02 VITALS
OXYGEN SATURATION: 96 % | SYSTOLIC BLOOD PRESSURE: 109 MMHG | HEART RATE: 79 BPM | WEIGHT: 174 LBS | BODY MASS INDEX: 27.97 KG/M2 | TEMPERATURE: 97.9 F | RESPIRATION RATE: 18 BRPM | HEIGHT: 66 IN | DIASTOLIC BLOOD PRESSURE: 69 MMHG

## 2020-02-02 PROCEDURE — 25000132 ZZH RX MED GY IP 250 OP 250 PS 637: Performed by: OBSTETRICS & GYNECOLOGY

## 2020-02-02 RX ORDER — IBUPROFEN 800 MG/1
800 TABLET, FILM COATED ORAL EVERY 6 HOURS PRN
COMMUNITY
Start: 2020-02-02

## 2020-02-02 RX ORDER — AMOXICILLIN 250 MG
1 CAPSULE ORAL 2 TIMES DAILY PRN
COMMUNITY
Start: 2020-02-02

## 2020-02-02 RX ORDER — OXYCODONE HYDROCHLORIDE 5 MG/1
5-10 TABLET ORAL
Qty: 12 TABLET | Refills: 0 | Status: SHIPPED | OUTPATIENT
Start: 2020-02-02

## 2020-02-02 RX ORDER — ACETAMINOPHEN 325 MG/1
650 TABLET ORAL EVERY 4 HOURS PRN
COMMUNITY
Start: 2020-02-02

## 2020-02-02 RX ADMIN — SENNOSIDES AND DOCUSATE SODIUM 2 TABLET: 8.6; 5 TABLET ORAL at 08:40

## 2020-02-02 RX ADMIN — IBUPROFEN 800 MG: 400 TABLET ORAL at 10:38

## 2020-02-02 RX ADMIN — ACETAMINOPHEN 650 MG: 325 TABLET, FILM COATED ORAL at 08:40

## 2020-02-02 RX ADMIN — IBUPROFEN 800 MG: 400 TABLET ORAL at 04:38

## 2020-02-02 RX ADMIN — ACETAMINOPHEN 975 MG: 325 TABLET, FILM COATED ORAL at 06:43

## 2020-02-02 NOTE — DISCHARGE INSTRUCTIONS
Postop  Birth Instructions    Activity       Do not lift more than 10 pounds for 6 weeks after surgery.  Ask family and friends for help when you need it.    No driving until you have stopped taking your pain medications (usually two weeks after surgery).    No heavy exercise or activity for 6 weeks.  Don't do anything that will put a strain on your surgery site.    Don't strain when using the toilet.  Your care team may prescribe a stool softener if you have problems with your bowel movements.     To care for your incision:       Keep the incision clean and dry.    Do not soak your incision in water. No swimming or hot tubs until it has fully healed. You may soak in the bathtub if the water level is below your incision.    Do not use peroxide, gel, cream, lotion, or ointment on your incision.    Adjust your clothes to avoid pressure on your surgery site (check the elastic in your underwear for example).     You may see a small amount of clear or pink drainage and this is normal.  Check with your health care provider:       If the drainage increases or has an odor.    If the incision reddens, you have swelling, or develop a rash.    If you have increased pain and the medicine we prescribed doesn't help.    If you have a fever above 100.4 F (38 C) with or without chills when placing thermometer under your tongue.   The area around your incision (surgery wound), will feel numb.  This is normal. The numbness should go away in less than a year.     Keep your hands clean:  Always wash your hands before touching your incision (surgery wound). This helps reduce your risk of infection. If your hands aren't dirty, you may use an alcohol hand-rub to clean your hands. Keep your nails clean and short.    Call your healthcare provider if you have any of these symptoms:       You soak a sanitary pad with blood within 1 hour, or you see blood clots larger than a golf ball.    Bleeding that lasts more than 6  weeks.    Vaginal discharge that smells bad.    Severe pain, cramping or tenderness in your lower belly area.    A need to urinate more frequently (use the toilet more often), more urgently (use the toilet very quickly), or it burns when you urinate.    Nausea and vomiting.    Redness, swelling or pain around a vein in your leg.    Problems breastfeeding or a red or painful area on your breast.    Chest pain and cough or are gasping for air.    A bad headache that doesn't improve with medication or a bad headache with vision changes can indicate your blood pressure is high.    Problems with coping with sadness, anxiety or depression. If you have concerns about hurting yourself or the baby, call your provider immediately.      You have questions or concerns after you return home.

## 2020-02-02 NOTE — PLAN OF CARE
D: VSS, assessments WDL.   I: Pt. received complete discharge paperwork and home medications as filled by discharge pharmacy.  Pt. was given times of last dose for all discharge medications in writing on discharge medication sheets.  Discharge teaching included home medication, pain management, activity restrictions, postpartum cares, and signs and symptoms of infection.    A: Discharge outcomes on care plan met.  Mother states understanding and comfort with self and baby cares.  P: Pt. discharged to home.  Pt. was discharged with baby, and bands were checked at time of discharge.  Pt. was accompanied by , nurse and baby, and left with personal belongings.  Home care will be ordered as appropriate for insurance coverage.  Pt. to follow up with OB per MD order.  Pt. had no further questions at the time of discharge and no unmet needs were identified.

## 2020-02-02 NOTE — PROGRESS NOTES
"OB Post-op  Section Progress Note POD# 3    S:  Patient doing well.  Pain well controlled with oral pain medication.  Ambulating.  Tolerating reg diet.  No N/V.  Passing flatus.  Voiding.  Bleeding is normal.  Breastfeeding.    O:  /72   Pulse 83   Temp 97.8  F (36.6  C) (Oral)   Resp 16   Ht 1.664 m (5' 5.5\")   Wt 78.9 kg (174 lb)   SpO2 96%   Breastfeeding Unknown   BMI 28.51 kg/m      Gen- A&O, NAD  Abd- soft, non tender, non distended  Fundus- firm, non tender  Incision/Dressing- C/D/I, steristrips in place  Ext- non-tender, no edema.     Hemoglobin   Date Value Ref Range Status   2020 10.7 (L) 11.7 - 15.7 g/dL Final     A neg, baby Rh +  Rubella =immune    A/P:  34 year old  POD# 3 s/p repeat LTCS     1.  Routine post-op cares  2.  Analgesia adequate  3.  Ambulate   4.  Discharge POD#3, Rx given for oxycodone. F/u in 6 weeks    Padmini Carreno MD  2020  7:41 AM   "

## 2020-02-02 NOTE — LACTATION NOTE
Routine visit.with Merline, FOB and baby.  Baby has been cluster feeding and Merline' s milk in,breast appear full.   Instructed on signs/symptoms of engorgement/ plugged ducts and mastitis.  Instructed on comfort measures and when to call MD.  Has a breast pump for home.  Parents request pacifier for  infant: parents informed about impact of pacifier on breastfeeding success (latch problems, nipple confusion, lower milk supply) and AAP best practice recommendation not to use pacifier for  baby before one month of age.  Parents instructed on other soothing techniques for fussy baby. And reviewed when to introduce bottle feeds.  Discussed the HaaKaa in order to help with softening the breast prior to feeds and to use if Merline encounters forceful let downs.     Continues to nurse well per mom. No further questions at this time.   Will follow as needed.   Hillary LAZON, RN, PHN, RNC-MNN, IBCLC

## 2020-02-02 NOTE — PLAN OF CARE
Fundus firm and bleeding wnl.  VSS.  Incision is clean, dry, intact and covered with steri strips.  Rates pain 2/10 and taking scheduled tylenol and ibuprofen as needed with good relief.  Up independently.  Using abdominal binder.  Passing flatus and tolerating regular diet.  Breasts engorged. Pumping minimally, using ice prn.  Encouraged to call with questions or concerns.  Will continue to monitor.

## 2020-02-05 NOTE — DISCHARGE SUMMARY
OB  Discharge Summary    DOA: 2020  DOD:  2020    Admission Diagnosis  1.  IUP @ 39w4d   2.  Previous  section x 2    Discharge Diagnosis  1.  IUP @ 39w4d   2.  POD# 3 s/p repeat  section       HPI / Hospital Course:     Patient is a 34 year old  39w4d who presented to L&D for a scheduled repeat  section.  Her prenatal course was unremarkable.    Intra-operative course:  Patient was taken to the operating room for the above noted procedure. No complications.  For full details of intraoperative course, please see dictated operative note.      Post-operative course:   Patient's post-operative course was uncomplicated.  By post-operative day #3 she was ambulating, her pain was well-controlled on oral pain medications, her bleeding was minimal, she was tolerating PO, she was voiding and passing flatus.  Patient was deemed stable for discharge.  Her post-op Hgb was   Lab Results   Component Value Date    HGB 10.7 2020    HGB 12.5 2020   .  Her vital signs remained stable  On the day of discharge, her wound was without evidence of infection.     Post-op instructions:  1.  Patient was instructed to RTC in 6 weeks for post-partum visit.  2.  Patient was instructed to call MD for temperature greater than 100.4, foul smelling vaginal discharge, bleeding >1pad per hour, severe pain not controlled by pain medications, severe HA, redness/drainage from incision, asymmetric LE edema or with other concerns.  3.  Post-partum mood symptoms discussed.  Pt will call with si/sx of depression.  4.  Patient instructed to avoid lifting >20# x 6 weeks, to avoid vigorous exercise x 6 weeks, to observe pelvic rest x 6 weeks (no tampons or IC) and to avoid driving while taking narcotics.  5.  Patient instructed to remove steri-strips after 1 week.       Post-op medications:  1.  Ibuprofen 600mg PO Q6hrs PRN pain  2.  Oxycodone 5mg PO Q4hrs PRN severe pain  3.  Colace or other stool  softener as needed  4.  PNV  5.  Resume home medications    Padmini Carreno MD  2/5/2020  6:43 AM

## (undated) DEVICE — BLADE CLIPPER 4406

## (undated) DEVICE — SU VICRYL 4-0 PS-2 18" UND J496H

## (undated) DEVICE — SU VICRYL 3-0 SH 27" J316H

## (undated) DEVICE — LINEN C-SECTION 5415

## (undated) DEVICE — SU VICRYL 2-0 CT-1 27" J339H

## (undated) DEVICE — SOL NACL 0.9% IRRIG 1000ML BOTTLE 07138-09

## (undated) DEVICE — SOL BENZOIN 0.5OZ

## (undated) DEVICE — GLOVE PROTEXIS POWDER FREE 6.5 ORTHOPEDIC 2D73ET65

## (undated) DEVICE — DRSG STERI STRIP 1/2X4" R1547

## (undated) DEVICE — SU VICRYL 0 CT 36" J358H

## (undated) DEVICE — GLOVE PROTEXIS W/NEU-THERA 7.5  2D73TE75

## (undated) DEVICE — ESU GROUND PAD UNIVERSAL W/O CORD

## (undated) DEVICE — PACK C-SECTION LF PL15OTA83B

## (undated) DEVICE — SOL WATER IRRIG 1000ML BOTTLE 07139-09

## (undated) DEVICE — PREP CHLORAPREP 26ML TINTED ORANGE  260815

## (undated) DEVICE — SU VICRYL 0 CT-1 27" J340H

## (undated) DEVICE — CATH TRAY FOLEY 16FR BARDEX W/DRAIN BAG STATLOCK 300316A

## (undated) DEVICE — SUCTION CANISTER MEDIVAC LINER 3000ML W/LID 65651-530